# Patient Record
Sex: FEMALE | URBAN - METROPOLITAN AREA
[De-identification: names, ages, dates, MRNs, and addresses within clinical notes are randomized per-mention and may not be internally consistent; named-entity substitution may affect disease eponyms.]

---

## 2018-10-29 ENCOUNTER — APPOINTMENT (RX ONLY)
Dept: URBAN - METROPOLITAN AREA CLINIC 170 | Facility: CLINIC | Age: 53
Setting detail: DERMATOLOGY
End: 2018-10-29

## 2018-10-29 DIAGNOSIS — L57.8 OTHER SKIN CHANGES DUE TO CHRONIC EXPOSURE TO NONIONIZING RADIATION: ICD-10-CM

## 2018-10-29 DIAGNOSIS — Z41.9 ENCOUNTER FOR PROCEDURE FOR PURPOSES OTHER THAN REMEDYING HEALTH STATE, UNSPECIFIED: ICD-10-CM

## 2018-10-29 PROCEDURE — ? COSMETIC CONSULTATION: FILLERS

## 2018-10-29 PROCEDURE — ? COSMETIC CONSULTATION: BOTULINUM TOXIN

## 2018-10-29 PROCEDURE — ? COUNSELING

## 2018-10-29 PROCEDURE — ? PATIENT SPECIFIC COUNSELING

## 2018-10-29 PROCEDURE — ? PRESCRIPTION

## 2018-10-29 RX ORDER — PHARMACY COMPOUNDING ACCESSORY
EACH MISCELLANEOUS
Qty: 1 | Refills: 3 | Status: ERX | COMMUNITY
Start: 2018-10-29

## 2018-10-29 RX ADMIN — Medication: at 16:31

## 2018-10-29 NOTE — PROCEDURE: PATIENT SPECIFIC COUNSELING
Detail Level: Simple
Filler - \\n1 Voluma, cheeks, $975\\n1 Juvederm XC, lower face $790\\nRestylane Refine , $950\\nTotal $2215\\nPrice includes multiple syringe discount \\n\\nProduct handout given \\nPre and post handout given
Discussed 39 units